# Patient Record
Sex: FEMALE | Race: WHITE | NOT HISPANIC OR LATINO | Employment: UNEMPLOYED | URBAN - METROPOLITAN AREA
[De-identification: names, ages, dates, MRNs, and addresses within clinical notes are randomized per-mention and may not be internally consistent; named-entity substitution may affect disease eponyms.]

---

## 2017-08-01 ENCOUNTER — TELEPHONE (OUTPATIENT)
Dept: OBSTETRICS AND GYNECOLOGY | Facility: CLINIC | Age: 27
End: 2017-08-01

## 2017-08-01 NOTE — TELEPHONE ENCOUNTER
"I can see her tomorrow.  I believe there is an opening at 4:00.  Please ask her to be here no later than 345.    ----- Message from Isabella Cade sent at 8/1/2017 11:15 AM EDT -----  Contact: Patient   Patient states hasn't been seen in 2 years (since her last delivery).   Patient c/o R breast lump/knot x 4 days.  Painful to the touch, swollen - feels like it's \"burning.\"  Not breast feeding.       Wants to know if she can be worked in today or sometime this week?      "

## 2017-08-02 ENCOUNTER — OFFICE VISIT (OUTPATIENT)
Dept: OBSTETRICS AND GYNECOLOGY | Facility: CLINIC | Age: 27
End: 2017-08-02

## 2017-08-02 ENCOUNTER — TELEPHONE (OUTPATIENT)
Dept: OBSTETRICS AND GYNECOLOGY | Facility: CLINIC | Age: 27
End: 2017-08-02

## 2017-08-02 VITALS
WEIGHT: 154 LBS | DIASTOLIC BLOOD PRESSURE: 78 MMHG | HEIGHT: 63 IN | BODY MASS INDEX: 27.29 KG/M2 | HEART RATE: 83 BPM | SYSTOLIC BLOOD PRESSURE: 122 MMHG

## 2017-08-02 DIAGNOSIS — N64.4 MASTODYNIA OF RIGHT BREAST: Primary | ICD-10-CM

## 2017-08-02 DIAGNOSIS — N63.13 BREAST LUMP ON RIGHT SIDE AT 8 O'CLOCK POSITION: ICD-10-CM

## 2017-08-02 DIAGNOSIS — N92.1 MENOMETRORRHAGIA: ICD-10-CM

## 2017-08-02 LAB
ERYTHROCYTE [DISTWIDTH] IN BLOOD BY AUTOMATED COUNT: 13.4 % (ref 11.7–13)
HCT VFR BLD AUTO: 40.4 % (ref 35.6–45.5)
HGB BLD-MCNC: 13.8 G/DL (ref 11.9–15.5)
MCH RBC QN AUTO: 29.5 PG (ref 26.9–32)
MCHC RBC AUTO-ENTMCNC: 34.2 G/DL (ref 32.4–36.3)
MCV RBC AUTO: 86.3 FL (ref 80.5–98.2)
PLATELET # BLD AUTO: 215 10*3/MM3 (ref 140–500)
RBC # BLD AUTO: 4.68 10*6/MM3 (ref 3.9–5.2)
TSH SERPL DL<=0.005 MIU/L-ACNC: 1.16 MIU/ML (ref 0.27–4.2)
WBC # BLD AUTO: 4.9 10*3/MM3 (ref 4.5–10.7)

## 2017-08-02 PROCEDURE — 99213 OFFICE O/P EST LOW 20 MIN: CPT | Performed by: OBSTETRICS & GYNECOLOGY

## 2017-08-02 NOTE — PROGRESS NOTES
"Subjective   Franci Oliveira is a 26 y.o. female   CC: Pt here for lump in rt breast.  She also reports problems with excessive vaginal bleeding  History of Present Illness  Pt here for lump in rt breast.  She first noticed about 2 days ago.  Prior to having the lump, she started to have a burning type pain in the same area of her right breast.  That began about 4 days ago.  She and her  both felt that they could feel a lump in the lower outer quadrant of the right breast.  She denies any nipple discharge.  She denies any skin changes.  She has not tried any medication for the pain.  She is a smoker and smokes about 1 pack per day.  She denies any significant caffeine intake.  Patient also has concerns about heavy and prolonged vaginal bleeding.  She reports that she has noted these problems since having her tubes tied after her last delivery in 2015.  She reports that she bleeds heavily and that her bleeding is all throughout the month.  Patient does have a history of a previous pulmonary embolism while on Ortho Evra patches.  She had an incomplete hematologic workup after the event and after her most recent pregnancy.    The following portions of the patient's history were reviewed and updated as appropriate: allergies, current medications, past family history, past medical history, past social history, past surgical history and problem list.    Review of Systems  General: No fever or chills  Constitutional: No weight loss or gain, no hair loss  HENT: No headache, no hearing loss, no tinnitus  Eyes: normal vision, no eye pain  Lungs: No cough, no shortness of breath  Heart: No chest pain, no palpitations  Abdomen: No nausea, vomiting, constipation or diarrhea  : No dysuria, no hematuria  Skin: No rashes  Psych: Normal though content, no hallucinations, no SI/HI    Objective   Physical Exam  Vitals:    08/02/17 1557   BP: 122/78   Pulse: 83   Weight: 154 lb (69.9 kg)   Height: 63\" (160 cm)   Gen: No acute " distress, awake and oriented times three  HENT: Normocephalic, atraumatic, Moist mucous membranes  Eyes: PERRLA, EOMI  Breast: Symmetrical. No skin changes or nipple retractions. No lumps or masses in the left breast. No tenderness bilaterally. Right breast with possible small 1x1 cm lump deep in the breast at about the 8 oclock position. This is fleshy, mobile, nontender  Pelvic: Deferred  Skin: Warm and dry, no rashes  Psych: Good judgement and insight, normal affect and mood  Neuro: CN 2-12 intact, no gross deficits    Assessment/Plan   Diagnoses and all orders for this visit:    Mastodynia of right breast  -     Mammo Diagnostic Right With CAD; Future  -     US breast right complete; Future    Breast lump on right side at 8 o'clock position  -     Mammo Diagnostic Right With CAD; Future  -     US breast right complete; Future    Menometrorrhagia  -     CBC (No Diff)  -     TSH Rfx On Abnormal To Free T4      Somewhat difficult to palpate a breast lump today.  The patient feels that she notices changes in this area of her breast.  For these reasons, we will obtain a diagnostic mammogram and breast ultrasound of the right breast.  Patient may use Tylenol or IV Profen over-the-counter to help with pain.  She is advised to avoid caffeine exposure.  There is nothing on exam today that would suggest an infectious source such as a breast abscess or mastitis.  I have advised the patient that if she does not hear from the radiology department regarding scheduling of her mammogram/ultrasound, she should call our office within 1 week.  Regarding the patient's heavy and prolonged periods, we will obtain basic lab evaluation today with CBC and TSH.  We will have the patient schedule a pelvic ultrasound for evaluation of saw sources of her excess bleeding.  Unfortunately, the patient is a poor candidate for hormonal contraception given her history of previous pulmonary embolism on birth control.  We have discussed surgical  options today including endometrial ablation and hysterectomy.  The patient reports that she is interested in having hysterectomy performed.  We will continue to address this in the future visits.  The patient will need to consider the fact that she may need to be on anticoagulation perioperatively given her history of pulmonary embolism.    I spent 10 out of 15 minutes with the patient in face to face counseling of the above issues.

## 2017-08-30 ENCOUNTER — TELEPHONE (OUTPATIENT)
Dept: OBSTETRICS AND GYNECOLOGY | Facility: CLINIC | Age: 27
End: 2017-08-30

## 2018-05-08 ENCOUNTER — OFFICE VISIT (OUTPATIENT)
Dept: OBSTETRICS AND GYNECOLOGY | Facility: CLINIC | Age: 28
End: 2018-05-08

## 2018-05-08 VITALS
HEART RATE: 80 BPM | BODY MASS INDEX: 31.89 KG/M2 | WEIGHT: 180 LBS | DIASTOLIC BLOOD PRESSURE: 75 MMHG | HEIGHT: 63 IN | SYSTOLIC BLOOD PRESSURE: 112 MMHG

## 2018-05-08 DIAGNOSIS — N94.6 DYSMENORRHEA: ICD-10-CM

## 2018-05-08 DIAGNOSIS — N92.6 MISSED PERIOD: Primary | ICD-10-CM

## 2018-05-08 DIAGNOSIS — N92.0 MENORRHAGIA WITH REGULAR CYCLE: ICD-10-CM

## 2018-05-08 PROBLEM — N63.13 BREAST LUMP ON RIGHT SIDE AT 8 O'CLOCK POSITION: Status: RESOLVED | Noted: 2017-08-02 | Resolved: 2018-05-08

## 2018-05-08 PROBLEM — N64.4 MASTODYNIA OF RIGHT BREAST: Status: RESOLVED | Noted: 2017-08-02 | Resolved: 2018-05-08

## 2018-05-08 PROBLEM — N92.1 MENOMETRORRHAGIA: Status: RESOLVED | Noted: 2017-08-02 | Resolved: 2018-05-08

## 2018-05-08 LAB
B-HCG UR QL: NEGATIVE
INTERNAL NEGATIVE CONTROL: NEGATIVE
INTERNAL POSITIVE CONTROL: POSITIVE
Lab: NORMAL

## 2018-05-08 PROCEDURE — 99212 OFFICE O/P EST SF 10 MIN: CPT | Performed by: OBSTETRICS & GYNECOLOGY

## 2018-05-08 PROCEDURE — 81025 URINE PREGNANCY TEST: CPT | Performed by: OBSTETRICS & GYNECOLOGY

## 2018-05-08 NOTE — PROGRESS NOTES
"Subjective   Franci Oliveira is a 27 y.o. female   CC:  Her period is late    History of Present Illness  Patient is here because she is late on her period.  She has had a tubal ligation in the past.  She's had significant pregnancy palpitations occluded  delivery, placental abruption.  She is also had a history of pulmonary embolism in the past.  She reports that her last period was 18.  Last week she took 2 pregnancy test.  Her first pregnancy test was positive, second pregnancy test was negative.  She does report a lot of stress in her life.  She reports that her periods are typically pretty regular.  Last year, she was seen for heavy and irregular periods, but she reports that the irregularity has improved, but now her periods are just heavy and painful.  Additionally last year, she reports that she was having breast pain and a breast mass was identified.  We had ordered a mammogram and ultrasound of the breasts, but the patient never went to the appointment.  She states that those symptoms have resolved.  She also never kept her appointment for ultrasound in the past.    The following portions of the patient's history were reviewed and updated as appropriate: allergies, current medications, past family history, past medical history, past social history, past surgical history and problem list.    Review of Systems  General: No fever or chills  Constitutional: No weight loss or gain, no hair loss  Abdomen: No nausea, vomiting, constipation or diarrhea  : No dysuria, no hematuria  Psych: Normal though content, no hallucinations, no SI/HI    Objective   Physical Exam  Vitals:    18 1254   BP: 112/75   Pulse: 80   Weight: 81.6 kg (180 lb)   Height: 160 cm (63\")   Gen: No acute distress, awake and oriented times three  Pelvic: Deferred  Psych: Good judgement and insight, normal affect and mood    Labs:  Office pregnancy test today negative    Assessment/Plan   Diagnoses and all orders for this " visit:    Missed period  -     HCG, B-subunit, Quantitative    Menorrhagia with regular cycle    Dysmenorrhea      Patient's office pregnancy test today is negative, given this in combination with her negative home test, I feel it is unlikely that she is pregnant.  We will check a quantitative hCG today as well.  We will call her tomorrow with the results.  We discussed typical menstrual periods and occasional irregularities that occur.  Patient wishes to have further evaluation for irregular, painful, or heavy.  She may return at her convenience.  She will also return for an annual exam at her convenience.

## 2018-05-08 NOTE — PROGRESS NOTES
Subjective   Franci Oliveira is a 27 y.o. female.     History of Present Illness   Pt here for pos and neg preg test.  {Common H&P Review Areas:32899}    Review of Systems    Objective   Physical Exam      Assessment/Plan   {Assess/PlanSmartLinks:40103}

## 2018-05-09 ENCOUNTER — TELEPHONE (OUTPATIENT)
Dept: OBSTETRICS AND GYNECOLOGY | Facility: CLINIC | Age: 28
End: 2018-05-09

## 2018-05-09 LAB — HCG INTACT+B SERPL-ACNC: <0.5 MIU/ML

## 2018-05-09 NOTE — TELEPHONE ENCOUNTER
----- Message from Nirmal Quinonez MD sent at 5/9/2018  9:48 AM EDT -----  Notify the patient that her blood pregnancy test was negative

## 2019-02-06 ENCOUNTER — OFFICE VISIT (OUTPATIENT)
Dept: OBSTETRICS AND GYNECOLOGY | Facility: CLINIC | Age: 29
End: 2019-02-06

## 2019-02-06 VITALS
HEIGHT: 63 IN | DIASTOLIC BLOOD PRESSURE: 80 MMHG | BODY MASS INDEX: 33.84 KG/M2 | WEIGHT: 191 LBS | SYSTOLIC BLOOD PRESSURE: 111 MMHG | HEART RATE: 121 BPM

## 2019-02-06 DIAGNOSIS — N94.6 DYSMENORRHEA: ICD-10-CM

## 2019-02-06 DIAGNOSIS — Z11.3 SCREEN FOR STD (SEXUALLY TRANSMITTED DISEASE): ICD-10-CM

## 2019-02-06 DIAGNOSIS — N94.10 DYSPAREUNIA IN FEMALE: ICD-10-CM

## 2019-02-06 DIAGNOSIS — N83.202 LEFT OVARIAN CYST: ICD-10-CM

## 2019-02-06 DIAGNOSIS — N39.3 STRESS INCONTINENCE IN FEMALE: ICD-10-CM

## 2019-02-06 DIAGNOSIS — N92.1 MENORRHAGIA WITH IRREGULAR CYCLE: Primary | ICD-10-CM

## 2019-02-06 DIAGNOSIS — Z12.4 SCREENING FOR CERVICAL CANCER: ICD-10-CM

## 2019-02-06 DIAGNOSIS — N81.11 CYSTOCELE, MIDLINE: ICD-10-CM

## 2019-02-06 PROCEDURE — 99214 OFFICE O/P EST MOD 30 MIN: CPT | Performed by: OBSTETRICS & GYNECOLOGY

## 2019-02-06 RX ORDER — FLUOXETINE HYDROCHLORIDE 20 MG/1
CAPSULE ORAL
Refills: 0 | COMMUNITY
Start: 2019-01-10 | End: 2019-02-26

## 2019-02-06 RX ORDER — CLONIDINE HYDROCHLORIDE 0.1 MG/1
0.1 TABLET ORAL 2 TIMES DAILY
Refills: 0 | COMMUNITY
Start: 2019-01-10 | End: 2019-02-26

## 2019-02-06 NOTE — PROGRESS NOTES
Subjective   Franci Oliveira is a 28 y.o. female.   CC: pt here for cyst on left ovary.  History of Present Illness   Patient was sent here from the emergency room for evaluation of what is very likely an incidental finding of a small hemorrhagic left ovarian cyst.  She also would like to address a number of long-standing gynecologic concerns.  Patient reports that her periods have been very heavy ever since she had her tubal ligation.  Realistically, the patient had had 6 pregnancies and a short time span before having her tubal ligation 4 years ago.  Prior to that, she was on birth control.  Now she says that her periods last 7-8 days at a time and are very heavy and very painful.  She also reports that occasionally she will get 2 periods within a month.  Patient reports severe cramping with her periods as well.  She also reports painful intercourse.  She says it feels like her cervix is getting hit with an anytime she tries to have sex.  She also reports frequent episodes of stress urinary incontinence especially with cough, sneeze, or laughing.  The patient went to [facility] emergency room on 1/17/19 because of fevers and flank pains.  She felt that she was having a kidney infection.  She reports that she gets these frequently.  CT scan at that visit did show perinephric stranding and signs of likely left pyelonephritis.  Her urine was positive for nitrites.  She had left CVA tenderness as well.  CT scan also had revealed a small left hemorrhagic cyst as well as a small pulmonary nodule.  The patient is a smoker.  She has no desire to stop smoking at this point.  Patient was told to follow-up with a gynecologist as well as her primary care physician because the findings on CT scan.  Patient states that she has not had a Pap smear in at least 4 years.  The patient was sent home from the emergency room on antibiotics, which she is completed.  She reports that the problems with flank pain, fevers, dysuria, etc. have  "all resolved.  She now wants to evaluate her long-standing heavy and painful periods.    Current Outpatient Medications on File Prior to Visit   Medication Sig   • CloNIDine (CATAPRES) 0.1 MG tablet Take 0.1 mg by mouth 2 (Two) Times a Day.   • FLUoxetine (PROzac) 20 MG capsule TK 1 C PO QD     No current facility-administered medications on file prior to visit.      No Known Allergies  The following portions of the patient's history were reviewed and updated as appropriate: allergies, current medications, past family history, past medical history, past social history, past surgical history and problem list.    Review of Systems  General: No fever or chills  Abdomen: No nausea, vomiting, constipation or diarrhea  : No dysuria, no hematuria  Psych: Normal though content, no hallucinations, no SI/HI    Objective   Physical Exam  Vitals:    02/06/19 1259   BP: 111/80   Pulse: (!) 121   Weight: 86.6 kg (191 lb)   Height: 160 cm (63\")     Patient's last menstrual period was 01/31/2019 (exact date).     Gen: No acute distress, awake and oriented times three  Abdomen: soft, nontender, no masses or hernia, no hepatosplenomegaly, non distended, normoactive bowel sounds  Pelvic: Exam performed in the presence of a female chaperone  Patient has provided verbal consent to proceed with exam.  Normal external female genitalia, no lesions  Urethra: Normal meatus, no caruncle  Bladder: nontender  Vagina: No blood; moderate amount of white discharge noted.  Patient has a grade 2 cystocele.  There is no significant uterine prolapse noted.  She has a grade 1 rectocele.  Cervix: No cervical motion tenderness, no lesions, no active bleeding, nonfriable  Uterus: Anteverted, normal size and shape, nontender  Adnexa: No masses or tenderness  External anal exam: Normal appearance, no lesions or hemorrhoids  Rectal: Deferred  Psych: Good judgement and insight, normal affect and mood      Assessment/Plan   Diagnoses and all orders for this " visit:    Menorrhagia with irregular cycle  -     CBC (No Diff)  -     TSH Rfx On Abnormal To Free T4   is concerned about her heavy and prolonged periods and she feels that it is related to her prior tubal ligation.  Realistically, I feel this is likely her baseline menses.  Patient had her tubes tied 4 years ago.  Prior to that she had had 6 pregnancies and a short time span.  In between she was also on contraception.  Prior to her tubal, she either had contraception that was likely improving her menses, or she was pregnant and not having periods.  Unfortunately because of her history of pulmonary embolism, she is not a good candidate for hormonal contraception.  We will check CBC and TSH as a potential cause of her periods.  We can get a pelvic ultrasound to evaluate for causes to her heavy periods.  Patient may want to consider surgical therapy such as endometrial ablation.  Her problems below, however, may also warrant hysterectomy.  Unfortunately, this patient is a smoker.  She would ideally need to quit smoking prior to surgery because of the risk of surgical complications from smoking such as infection, wound breakdown, DVT, etc.  She would also likely need to go back onto anticoagulation perioperatively which has its own risks.  I explained to the patient that if she can avoid major abdominal surgery such as a hysterectomy this would be the ideal situation for her.  Dysmenorrhea    Left ovarian cyst  This was likely incidental finding.  I explained that pathophysiology of functional and nonmalignant cyst formation in the reproductive age woman.  We can check on the upcoming ultrasound to see if the cyst has resolved.  Dyspareunia in female  Referral to urogynecology.  Screening for cervical cancer  -     IGP,CtNgTv,rfx Apt HPV All    Screen for STD (sexually transmitted disease)  -     IGP,CtNgTv,rfx Apt HPV All  She is instructed to call our office for the results if she has not heard them after 1  week.   Cystocele, midline  -     Ambulatory Referral to Gynecologic Urology    Stress incontinence in female    Patient does have a midline cystocele.  We discussed avoiding straining.  We discussed devices such as pessaries.  The patient seems very hesitant to try a pessary at this time.  We will send referral to urogynecology for evaluation and management of cystocele, possibly as a relates to dyspareunia, and also evaluation and management of stress urinary incontinence.

## 2019-02-07 ENCOUNTER — TELEPHONE (OUTPATIENT)
Dept: OBSTETRICS AND GYNECOLOGY | Facility: CLINIC | Age: 29
End: 2019-02-07

## 2019-02-07 LAB
ERYTHROCYTE [DISTWIDTH] IN BLOOD BY AUTOMATED COUNT: 13.2 % (ref 11.7–13)
HCT VFR BLD AUTO: 44.4 % (ref 35.6–45.5)
HGB BLD-MCNC: 15.2 G/DL (ref 11.9–15.5)
MCH RBC QN AUTO: 30.2 PG (ref 26.9–32)
MCHC RBC AUTO-ENTMCNC: 34.2 G/DL (ref 32.4–36.3)
MCV RBC AUTO: 88.3 FL (ref 80.5–98.2)
PLATELET # BLD AUTO: 285 10*3/MM3 (ref 140–500)
RBC # BLD AUTO: 5.03 10*6/MM3 (ref 3.9–5.2)
TSH SERPL DL<=0.005 MIU/L-ACNC: 0.28 MIU/ML (ref 0.27–4.2)
WBC # BLD AUTO: 10.02 10*3/MM3 (ref 4.5–10.7)

## 2019-02-07 NOTE — TELEPHONE ENCOUNTER
Called pt to inform her of referral appt.  States she will have to call back when she can write it down.    Please inform pt she is scheduled with [facility] on 2/22/19 @ 8:30, 8:15 arrival.  Address is [address] [place] [address] [id number].  Phone # [phone].  She may call them to reschedule if that day/time do not work for her.

## 2019-02-08 ENCOUNTER — TELEPHONE (OUTPATIENT)
Dept: OBSTETRICS AND GYNECOLOGY | Facility: CLINIC | Age: 29
End: 2019-02-08

## 2019-02-08 LAB
C TRACH RRNA CVX QL NAA+PROBE: NEGATIVE
CONV .: NORMAL
CYTOLOGIST CVX/VAG CYTO: NORMAL
CYTOLOGY CVX/VAG DOC THIN PREP: NORMAL
DX ICD CODE: NORMAL
HIV 1 & 2 AB SER-IMP: NORMAL
N GONORRHOEA RRNA CVX QL NAA+PROBE: NEGATIVE
OTHER STN SPEC: NORMAL
PATH REPORT.FINAL DX SPEC: NORMAL
STAT OF ADQ CVX/VAG CYTO-IMP: NORMAL
T VAGINALIS RRNA SPEC QL NAA+PROBE: NEGATIVE

## 2019-02-08 NOTE — TELEPHONE ENCOUNTER
Left message to call office. DONY        ----- Message from Nirmal Quinonez MD sent at 2/7/2019  9:08 AM EST -----  Notify the patient that her blood work was normal

## 2019-02-11 ENCOUNTER — TELEPHONE (OUTPATIENT)
Dept: OBSTETRICS AND GYNECOLOGY | Facility: CLINIC | Age: 29
End: 2019-02-11

## 2019-02-11 NOTE — TELEPHONE ENCOUNTER
Pt aware. DONY      ----- Message from Nirmal Quinonez MD sent at 2/8/2019  8:13 PM EST -----  Notify pt that her Pap was normal and her cultures were negative

## 2019-02-26 ENCOUNTER — PROCEDURE VISIT (OUTPATIENT)
Dept: OBSTETRICS AND GYNECOLOGY | Facility: CLINIC | Age: 29
End: 2019-02-26

## 2019-02-26 ENCOUNTER — OFFICE VISIT (OUTPATIENT)
Dept: OBSTETRICS AND GYNECOLOGY | Facility: CLINIC | Age: 29
End: 2019-02-26

## 2019-02-26 VITALS
WEIGHT: 184 LBS | SYSTOLIC BLOOD PRESSURE: 121 MMHG | DIASTOLIC BLOOD PRESSURE: 86 MMHG | BODY MASS INDEX: 32.6 KG/M2 | HEIGHT: 63 IN | HEART RATE: 104 BPM

## 2019-02-26 DIAGNOSIS — N39.3 STRESS INCONTINENCE IN FEMALE: ICD-10-CM

## 2019-02-26 DIAGNOSIS — N92.1 MENORRHAGIA WITH IRREGULAR CYCLE: Primary | ICD-10-CM

## 2019-02-26 DIAGNOSIS — N94.6 DYSMENORRHEA: ICD-10-CM

## 2019-02-26 DIAGNOSIS — N94.10 DYSPAREUNIA IN FEMALE: ICD-10-CM

## 2019-02-26 DIAGNOSIS — N81.11 CYSTOCELE, MIDLINE: ICD-10-CM

## 2019-02-26 DIAGNOSIS — N92.1 MENORRHAGIA WITH IRREGULAR CYCLE: ICD-10-CM

## 2019-02-26 DIAGNOSIS — N83.202 LEFT OVARIAN CYST: Primary | ICD-10-CM

## 2019-02-26 PROBLEM — N92.6 MISSED PERIOD: Status: RESOLVED | Noted: 2018-05-08 | Resolved: 2019-02-26

## 2019-02-26 PROCEDURE — 99214 OFFICE O/P EST MOD 30 MIN: CPT | Performed by: OBSTETRICS & GYNECOLOGY

## 2019-02-26 PROCEDURE — 76830 TRANSVAGINAL US NON-OB: CPT | Performed by: OBSTETRICS & GYNECOLOGY

## 2019-02-26 NOTE — PROGRESS NOTES
"Subjective   Franci Oliveira is a 28 y.o. female.   CC: pt here for f/u left cyst US.  History of Present Illness   Patient is here for follow-up of heavy periods and then ovarian cyst that was seen in the ER in January 2019.  She reports that her periods are extremely heavy and painful.  She wants to have surgery done for this.  She also reports painful intercourse and pelvic pain and pressure.  She has had a tubal ligation in the past.  She also has a history of a pulmonary embolism.  She no longer requires active anticoagulation.  She is a smoker.  Patient had an appointment with urogynecology but she overslept and missed it.  She has an appointment scheduled with them again in April.  Patient has cystocele on pelvic organ prolapse on exam which may be contributing to her pain and painful intercourse.    Current Outpatient Medications on File Prior to Visit   Medication Sig   • [DISCONTINUED] CloNIDine (CATAPRES) 0.1 MG tablet Take 0.1 mg by mouth 2 (Two) Times a Day.   • [DISCONTINUED] FLUoxetine (PROzac) 20 MG capsule TK 1 C PO QD     No current facility-administered medications on file prior to visit.      No Known Allergies     The following portions of the patient's history were reviewed and updated as appropriate: allergies, current medications, past family history, past medical history, past social history, past surgical history and problem list.    Review of Systems  General: No fever or chills  Constitutional: No weight loss or gain, no hair loss  Abdomen: No nausea, vomiting, constipation or diarrhea  : No dysuria, no hematuria  Psych: Normal though content, no hallucinations, no SI/HI    Objective   Physical Exam  Vitals:    02/26/19 1310   BP: 121/86   Pulse: 104   Weight: 83.5 kg (184 lb)   Height: 160 cm (63\")     Patient's last menstrual period was 01/31/2019 (exact date).     General: No acute distress, awake and oriented x3  Psychiatric: Good judgment and insight, normal affect and " mood    Ultrasound today: Uterus 7.5 cm in maximum dimension.  Endometrial lining 0.5 cm.  Ovaries are normal appearing bilaterally.  There is no free fluid.    Labs last visit: TSH normal, hemoglobin 15    Assessment/Plan   Diagnoses and all orders for this visit:    Left ovarian cyst - resolveed  There is no ovarian cyst seen on ultrasound today.  The ultrasound is completely normal.  I suspect that she may have had a physiologic cyst back in January that was an incidental finding.  No further workup of this is necessary.  Menorrhagia with irregular cycle  Patient reports very heavy periods and states that she wants to have a hysterectomy done.  Despite this, her hemoglobin is 15.  Uterus is completely normal today.  I am very hesitant to perform a hysterectomy in a woman with a history of a prior pulmonary embolism, who actively smokes, and who has no objective signs of significant menorrhagia.  If the patient must have surgery, I would encourage her to consider an endometrial ablation as this will have much quicker recovery, much less perioperative risk, and lower risk of DVT or pulmonary embolism.  Furthermore, she may not need to start on anticoagulation around the time of an endometrial ablation as opposed to the fact that she would need anticoagulation for hysterectomy.  Patient still needs to follow-up with urogynecology for her pelvic organ prolapse.  They may recommend hysterectomy for the management of her pelvic organ prolapse, if so, they can perform that for her.  If urogynecology we will not perform a hysterectomy, patient may return to see me to discuss endometrial ablation.  I did explain to the patient that I will be leaving the practice on 6/30/19.  She verbalized understanding  Cystocele, midline  Follow-up with urogynecology.  She missed her first appointment with him.  Discussed the importance of keeping her appointments.  Stress incontinence in female    Dysmenorrhea    Dyspareunia in  female  This may be related to pelvic organ prolapse.  May also be secondary to the fact the patient has a retroverted uterus which may be exacerbating the symptoms.  Follow-up with urogynecology    I spent 13 out of 15 minutes with the patient in face to face counseling of the above issues.

## 2019-06-05 ENCOUNTER — TELEPHONE (OUTPATIENT)
Dept: OBSTETRICS AND GYNECOLOGY | Facility: CLINIC | Age: 29
End: 2019-06-05

## 2019-06-05 NOTE — TELEPHONE ENCOUNTER
"Patient states you referred her to a urogynecologist in February.  Patient states she was three minutes late past their rachel period and they would not see her and told her she was dismissed from the practice (she states she threw a \"hissy fit\").  Patient is wanting a referral to a new group.  States her symptoms have not changed and she would really like to get in to see someone.   "

## 2019-06-05 NOTE — TELEPHONE ENCOUNTER
The original order from February is likely still good.  There are 2 urogynecology groups in the area.  One is Dr. Serg Lee with [facility].  The other is [facility].  Find out which office the patient went to, and then resubmit the referral to the other group.

## 2019-06-06 NOTE — TELEPHONE ENCOUNTER
L/m for pt to call.    Please inform pt she is scheduled with YVON[facility] collins/Dr. Serg Lee on 7/15/19 at 1:00pm, 12:45 arrival.  Address is [address] [place] [address] [facility].  Phone # [phone], if she would like to reschedule.  They will mail her a new patient packet to complete and take to her appt with her.    Pt # [phone]

## 2020-11-02 ENCOUNTER — OFFICE VISIT (OUTPATIENT)
Dept: OBSTETRICS AND GYNECOLOGY | Facility: CLINIC | Age: 30
End: 2020-11-02

## 2020-11-02 VITALS
HEIGHT: 63 IN | BODY MASS INDEX: 26.93 KG/M2 | SYSTOLIC BLOOD PRESSURE: 120 MMHG | WEIGHT: 152 LBS | DIASTOLIC BLOOD PRESSURE: 80 MMHG

## 2020-11-02 DIAGNOSIS — N94.10 FEMALE DYSPAREUNIA: ICD-10-CM

## 2020-11-02 DIAGNOSIS — N81.4 CYSTOCELE WITH PROLAPSE: Primary | ICD-10-CM

## 2020-11-02 PROCEDURE — 99213 OFFICE O/P EST LOW 20 MIN: CPT | Performed by: NURSE PRACTITIONER

## 2020-11-02 NOTE — PROGRESS NOTES
Chief Complaint   Patient presents with   • Follow-up     Pt states she has bladder prolapse.        SUBJECTIVE:     Franci Oliveira is a 30 y.o.  who presents with c/o bladder prolapse that causes stress incontinence and pain with intercourse. This is not a new problem, but this is the first time I am seeing the pt for this issue. Symptoms began several years ago.  She reports incontinence with cough and sneezing. She was previously seen Dr Quinonez for this issue and was referred to Uro/gyn, she states she has not yet been seen by uro/gyn. Hx of PE at age 24 y/o, she states caused by OCP, she is an every day smoker and has COPD.  LMP 10/26/2020. She desires to have cystocele repaired. Prior tubal ligation.    This is my first time meeting Franci Oliveira      Past Medical History:   Diagnosis Date   • Blood clotting disorder (CMS/HCC)    • Placental abruption    • Pulmonary embolism (CMS/HCC)     while on ortho evra   • Urinary tract infection       Past Surgical History:   Procedure Laterality Date   • APPENDECTOMY     •  SECTION      h/o classical CD at 28 weeks   •  SECTION WITH TUBAL     • CHOLECYSTECTOMY     • TUBAL ABDOMINAL LIGATION        Social History     Tobacco Use   • Smoking status: Current Every Day Smoker     Packs/day: 1.00     Types: Cigarettes   • Smokeless tobacco: Never Used   Substance Use Topics   • Alcohol use: No   • Drug use: No     OB History    Para Term  AB Living   6 5 3 2 1 5   SAB TAB Ectopic Molar Multiple Live Births   1                # Outcome Date GA Lbr Manuelito/2nd Weight Sex Delivery Anes PTL Lv   6 SAB            5 Term      Vag-Spont      4 Term      Vag-Spont      3 Term      Vag-Spont      2   36w0d  3629 g (8 lb) M CS-LTranv      1   28w2d  1219 g (2 lb 11 oz) M CS-LTranv           Review of Systems   Constitutional: Negative for chills, fatigue and fever.   Gastrointestinal: Negative for abdominal distention, abdominal  "pain, nausea and vomiting.   Genitourinary: Positive for dyspareunia. Negative for difficulty urinating, dysuria, pelvic pain, vaginal bleeding, vaginal discharge and vaginal pain.        +stress incontinence    Musculoskeletal: Negative for gait problem.   Skin: Negative for rash and wound.   Neurological: Negative for headaches.   Psychiatric/Behavioral: Negative for behavioral problems.       OBJECTIVE:   Vitals:    11/02/20 1109   BP: 120/80   Weight: 68.9 kg (152 lb)   Height: 160 cm (63\")        Physical Exam  Vitals signs and nursing note reviewed.   Constitutional:       Appearance: Normal appearance.   HENT:      Head: Normocephalic and atraumatic.   Neck:      Musculoskeletal: Normal range of motion.   Cardiovascular:      Rate and Rhythm: Normal rate.   Pulmonary:      Effort: Pulmonary effort is normal.   Abdominal:      General: Abdomen is flat. There is no distension.      Palpations: Abdomen is soft. There is no mass.      Tenderness: There is no abdominal tenderness. There is no guarding.      Hernia: No hernia is present. There is no hernia in the left inguinal area or right inguinal area.   Genitourinary:     Exam position: Lithotomy position.      Pubic Area: No rash or pubic lice.       Labia:         Right: No rash, tenderness, lesion or injury.         Left: No rash, tenderness, lesion or injury.       Urethra: No prolapse, urethral pain, urethral swelling or urethral lesion.      Vagina: No signs of injury and foreign body. Prolapsed vaginal walls present. No vaginal discharge, erythema, tenderness, bleeding (mild cystocele) or lesions.      Cervix: No cervical motion tenderness, discharge, friability, lesion, erythema, cervical bleeding or eversion.      Uterus: Not deviated, not enlarged, not fixed, not tender and no uterine prolapse.       Adnexa:         Right: No mass, tenderness or fullness.          Left: No mass, tenderness or fullness.     Musculoskeletal: Normal range of motion. "   Lymphadenopathy:      Lower Body: No right inguinal adenopathy. No left inguinal adenopathy.   Skin:     General: Skin is warm and dry.   Neurological:      General: No focal deficit present.      Mental Status: She is alert and oriented to person, place, and time.      Cranial Nerves: No cranial nerve deficit.   Psychiatric:         Mood and Affect: Mood normal.         Behavior: Behavior normal.         Thought Content: Thought content normal.         Judgment: Judgment normal.         ASSESSMENT:   1) Bladder prolapse    PLAN:   Mild cystocele noted  I have reviewed prior notes from Dr Quinonez from 2018 & 2019, it appears she had a referral to [facility] but was not seen because she was late for appt. She was then   scheduled with [facility] 7/15/2019. She states she has not been seen yet by Uro/gyn.   New referral entered to be evaluated by Uro/gyn, she requests to schedule with [facility]  Due for annual gyn exam    Follow up: Annually and PRN    Chela Angulo, CLIVE  11/2/2020  11:15 EST

## 2020-11-03 ENCOUNTER — TELEPHONE (OUTPATIENT)
Dept: OBSTETRICS AND GYNECOLOGY | Facility: CLINIC | Age: 30
End: 2020-11-03

## 2020-11-03 NOTE — TELEPHONE ENCOUNTER
L/m for pt to call regarding referral to GyUrology.    Please inform pt she is scheduled [facility]Dr. Serg Lee on 11/9/2020 at 10:15am, 10:00 arrival.  Pt will need to call their office when she arrives, at [phone].  Their address is [address] [place] [address].    Pt # [phone]